# Patient Record
Sex: MALE | URBAN - METROPOLITAN AREA
[De-identification: names, ages, dates, MRNs, and addresses within clinical notes are randomized per-mention and may not be internally consistent; named-entity substitution may affect disease eponyms.]

---

## 2017-11-28 ENCOUNTER — INPATIENT (INPATIENT)
Facility: HOSPITAL | Age: 67
LOS: 1 days | Discharge: HOME | DRG: 247 | End: 2017-11-30
Attending: INTERNAL MEDICINE | Admitting: INTERNAL MEDICINE
Payer: COMMERCIAL

## 2017-11-28 VITALS
RESPIRATION RATE: 20 BRPM | HEART RATE: 73 BPM | WEIGHT: 250 LBS | HEIGHT: 72 IN | TEMPERATURE: 99 F | SYSTOLIC BLOOD PRESSURE: 144 MMHG | OXYGEN SATURATION: 98 % | DIASTOLIC BLOOD PRESSURE: 88 MMHG

## 2017-11-28 DIAGNOSIS — R93.1 ABNORMAL FINDINGS ON DIAGNOSTIC IMAGING OF HEART AND CORONARY CIRCULATION: ICD-10-CM

## 2017-11-28 DIAGNOSIS — K40.20 BILATERAL INGUINAL HERNIA, WITHOUT OBSTRUCTION OR GANGRENE, NOT SPECIFIED AS RECURRENT: Chronic | ICD-10-CM

## 2017-11-28 DIAGNOSIS — Z90.49 ACQUIRED ABSENCE OF OTHER SPECIFIED PARTS OF DIGESTIVE TRACT: Chronic | ICD-10-CM

## 2017-11-28 DIAGNOSIS — Z98.890 OTHER SPECIFIED POSTPROCEDURAL STATES: Chronic | ICD-10-CM

## 2017-11-28 LAB
ALBUMIN SERPL ELPH-MCNC: 4.6 G/DL — SIGNIFICANT CHANGE UP (ref 3.3–5)
ALP SERPL-CCNC: 63 U/L — SIGNIFICANT CHANGE UP (ref 40–120)
ALT FLD-CCNC: 56 U/L RC — HIGH (ref 10–45)
ANION GAP SERPL CALC-SCNC: 12 MMOL/L — SIGNIFICANT CHANGE UP (ref 5–17)
AST SERPL-CCNC: 33 U/L — SIGNIFICANT CHANGE UP (ref 10–40)
BILIRUB SERPL-MCNC: 0.5 MG/DL — SIGNIFICANT CHANGE UP (ref 0.2–1.2)
BUN SERPL-MCNC: 13 MG/DL — SIGNIFICANT CHANGE UP (ref 7–23)
CALCIUM SERPL-MCNC: 9.2 MG/DL — SIGNIFICANT CHANGE UP (ref 8.4–10.5)
CHLORIDE SERPL-SCNC: 103 MMOL/L — SIGNIFICANT CHANGE UP (ref 96–108)
CO2 SERPL-SCNC: 27 MMOL/L — SIGNIFICANT CHANGE UP (ref 22–31)
CREAT SERPL-MCNC: 1.05 MG/DL — SIGNIFICANT CHANGE UP (ref 0.5–1.3)
GLUCOSE SERPL-MCNC: 124 MG/DL — HIGH (ref 70–99)
HCT VFR BLD CALC: 44.5 % — SIGNIFICANT CHANGE UP (ref 39–50)
HGB BLD-MCNC: 15.3 G/DL — SIGNIFICANT CHANGE UP (ref 13–17)
MCHC RBC-ENTMCNC: 31.8 PG — SIGNIFICANT CHANGE UP (ref 27–34)
MCHC RBC-ENTMCNC: 34.3 GM/DL — SIGNIFICANT CHANGE UP (ref 32–36)
MCV RBC AUTO: 92.6 FL — SIGNIFICANT CHANGE UP (ref 80–100)
PLATELET # BLD AUTO: 246 K/UL — SIGNIFICANT CHANGE UP (ref 150–400)
POTASSIUM SERPL-MCNC: 4 MMOL/L — SIGNIFICANT CHANGE UP (ref 3.5–5.3)
POTASSIUM SERPL-SCNC: 4 MMOL/L — SIGNIFICANT CHANGE UP (ref 3.5–5.3)
PROT SERPL-MCNC: 8.1 G/DL — SIGNIFICANT CHANGE UP (ref 6–8.3)
RBC # BLD: 4.8 M/UL — SIGNIFICANT CHANGE UP (ref 4.2–5.8)
RBC # FLD: 12.9 % — SIGNIFICANT CHANGE UP (ref 10.3–14.5)
SODIUM SERPL-SCNC: 142 MMOL/L — SIGNIFICANT CHANGE UP (ref 135–145)
WBC # BLD: 8.5 K/UL — SIGNIFICANT CHANGE UP (ref 3.8–10.5)
WBC # FLD AUTO: 8.5 K/UL — SIGNIFICANT CHANGE UP (ref 3.8–10.5)

## 2017-11-28 PROCEDURE — 93454 CORONARY ARTERY ANGIO S&I: CPT | Mod: 26,59

## 2017-11-28 PROCEDURE — 92928 PRQ TCAT PLMT NTRAC ST 1 LES: CPT | Mod: LD

## 2017-11-28 PROCEDURE — 99152 MOD SED SAME PHYS/QHP 5/>YRS: CPT

## 2017-11-28 PROCEDURE — 93010 ELECTROCARDIOGRAM REPORT: CPT | Mod: 76

## 2017-11-28 RX ORDER — ALPRAZOLAM 0.25 MG
1 TABLET ORAL DAILY
Qty: 0 | Refills: 0 | Status: DISCONTINUED | OUTPATIENT
Start: 2017-11-28 | End: 2017-11-30

## 2017-11-28 RX ORDER — ATORVASTATIN CALCIUM 80 MG/1
1 TABLET, FILM COATED ORAL
Qty: 0 | Refills: 0 | COMMUNITY

## 2017-11-28 RX ORDER — SODIUM CHLORIDE 9 MG/ML
3 INJECTION INTRAMUSCULAR; INTRAVENOUS; SUBCUTANEOUS EVERY 8 HOURS
Qty: 0 | Refills: 0 | Status: DISCONTINUED | OUTPATIENT
Start: 2017-11-28 | End: 2017-11-30

## 2017-11-28 RX ORDER — ASPIRIN/CALCIUM CARB/MAGNESIUM 324 MG
81 TABLET ORAL DAILY
Qty: 0 | Refills: 0 | Status: DISCONTINUED | OUTPATIENT
Start: 2017-11-28 | End: 2017-11-30

## 2017-11-28 RX ORDER — INFLUENZA VIRUS VACCINE 15; 15; 15; 15 UG/.5ML; UG/.5ML; UG/.5ML; UG/.5ML
0.5 SUSPENSION INTRAMUSCULAR ONCE
Qty: 0 | Refills: 0 | Status: COMPLETED | OUTPATIENT
Start: 2017-11-28 | End: 2017-11-30

## 2017-11-28 RX ORDER — ALPRAZOLAM 0.25 MG
1 TABLET ORAL
Qty: 0 | Refills: 0 | COMMUNITY

## 2017-11-28 RX ORDER — ATORVASTATIN CALCIUM 80 MG/1
40 TABLET, FILM COATED ORAL AT BEDTIME
Qty: 0 | Refills: 0 | Status: DISCONTINUED | OUTPATIENT
Start: 2017-11-28 | End: 2017-11-30

## 2017-11-28 RX ORDER — CLOPIDOGREL BISULFATE 75 MG/1
75 TABLET, FILM COATED ORAL DAILY
Qty: 0 | Refills: 0 | Status: DISCONTINUED | OUTPATIENT
Start: 2017-11-28 | End: 2017-11-30

## 2017-11-28 RX ADMIN — ATORVASTATIN CALCIUM 40 MILLIGRAM(S): 80 TABLET, FILM COATED ORAL at 22:16

## 2017-11-28 RX ADMIN — SODIUM CHLORIDE 3 MILLILITER(S): 9 INJECTION INTRAMUSCULAR; INTRAVENOUS; SUBCUTANEOUS at 22:16

## 2017-11-28 RX ADMIN — Medication 1 MILLIGRAM(S): at 22:15

## 2017-11-28 RX ADMIN — SODIUM CHLORIDE 3 MILLILITER(S): 9 INJECTION INTRAMUSCULAR; INTRAVENOUS; SUBCUTANEOUS at 13:06

## 2017-11-28 NOTE — H&P CARDIOLOGY - HISTORY OF PRESENT ILLNESS
68 y/o  m tarik with PMHx of Anxiety, HLD,no cardiac disease, family h/o of heart disease ( mom 90 ICD), current smoker, asymptomatic now for routine stress testing reveals ischemia in anterior apical - septal wall -LAD area there is mild septal hypokinesis, seen & evaluated by Dr Dawkins & now recommends for cardiac cath.    toprol 25 & ranexa 500 mg x 1 dose as antianginals

## 2017-11-28 NOTE — H&P CARDIOLOGY - PSH
Bilateral inguinal hernia (BIH)  repair wiyh mesh  H/O right knee surgery    History of cholecystectomy

## 2017-11-28 NOTE — PATIENT PROFILE ADULT. - ABILITY TO HEAR (WITH HEARING AID OR HEARING APPLIANCE IF NORMALLY USED):
hearing aids at home/Mildly to Moderately Impaired: difficulty hearing in some environments or speaker may need to increase volume or speak distinctly

## 2017-11-29 ENCOUNTER — TRANSCRIPTION ENCOUNTER (OUTPATIENT)
Age: 67
End: 2017-11-29

## 2017-11-29 LAB
ANION GAP SERPL CALC-SCNC: 14 MMOL/L — SIGNIFICANT CHANGE UP (ref 5–17)
BUN SERPL-MCNC: 14 MG/DL — SIGNIFICANT CHANGE UP (ref 7–23)
CALCIUM SERPL-MCNC: 9.2 MG/DL — SIGNIFICANT CHANGE UP (ref 8.4–10.5)
CHLORIDE SERPL-SCNC: 103 MMOL/L — SIGNIFICANT CHANGE UP (ref 96–108)
CO2 SERPL-SCNC: 23 MMOL/L — SIGNIFICANT CHANGE UP (ref 22–31)
CREAT SERPL-MCNC: 0.91 MG/DL — SIGNIFICANT CHANGE UP (ref 0.5–1.3)
GLUCOSE SERPL-MCNC: 92 MG/DL — SIGNIFICANT CHANGE UP (ref 70–99)
HCT VFR BLD CALC: 42.1 % — SIGNIFICANT CHANGE UP (ref 39–50)
HGB BLD-MCNC: 14.6 G/DL — SIGNIFICANT CHANGE UP (ref 13–17)
MCHC RBC-ENTMCNC: 32.1 PG — SIGNIFICANT CHANGE UP (ref 27–34)
MCHC RBC-ENTMCNC: 34.7 GM/DL — SIGNIFICANT CHANGE UP (ref 32–36)
MCV RBC AUTO: 92.6 FL — SIGNIFICANT CHANGE UP (ref 80–100)
PLATELET # BLD AUTO: 241 K/UL — SIGNIFICANT CHANGE UP (ref 150–400)
POTASSIUM SERPL-MCNC: 4.1 MMOL/L — SIGNIFICANT CHANGE UP (ref 3.5–5.3)
POTASSIUM SERPL-SCNC: 4.1 MMOL/L — SIGNIFICANT CHANGE UP (ref 3.5–5.3)
RBC # BLD: 4.55 M/UL — SIGNIFICANT CHANGE UP (ref 4.2–5.8)
RBC # FLD: 13 % — SIGNIFICANT CHANGE UP (ref 10.3–14.5)
SODIUM SERPL-SCNC: 140 MMOL/L — SIGNIFICANT CHANGE UP (ref 135–145)
WBC # BLD: 10.4 K/UL — SIGNIFICANT CHANGE UP (ref 3.8–10.5)
WBC # FLD AUTO: 10.4 K/UL — SIGNIFICANT CHANGE UP (ref 3.8–10.5)

## 2017-11-29 PROCEDURE — 93010 ELECTROCARDIOGRAM REPORT: CPT

## 2017-11-29 PROCEDURE — 92933 PRQ TRLML C ATHRC ST ANGIOP1: CPT | Mod: RC

## 2017-11-29 PROCEDURE — 99152 MOD SED SAME PHYS/QHP 5/>YRS: CPT

## 2017-11-29 RX ORDER — SENNA PLUS 8.6 MG/1
2 TABLET ORAL ONCE
Qty: 0 | Refills: 0 | Status: COMPLETED | OUTPATIENT
Start: 2017-11-29 | End: 2017-11-29

## 2017-11-29 RX ORDER — CLOPIDOGREL BISULFATE 75 MG/1
1 TABLET, FILM COATED ORAL
Qty: 0 | Refills: 0 | COMMUNITY
Start: 2017-11-29

## 2017-11-29 RX ORDER — ASPIRIN/CALCIUM CARB/MAGNESIUM 324 MG
1 TABLET ORAL
Qty: 0 | Refills: 0 | COMMUNITY
Start: 2017-11-29

## 2017-11-29 RX ADMIN — CLOPIDOGREL BISULFATE 75 MILLIGRAM(S): 75 TABLET, FILM COATED ORAL at 06:10

## 2017-11-29 RX ADMIN — Medication 1 MILLIGRAM(S): at 21:53

## 2017-11-29 RX ADMIN — ATORVASTATIN CALCIUM 40 MILLIGRAM(S): 80 TABLET, FILM COATED ORAL at 21:53

## 2017-11-29 RX ADMIN — Medication 81 MILLIGRAM(S): at 06:10

## 2017-11-29 RX ADMIN — SENNA PLUS 2 TABLET(S): 8.6 TABLET ORAL at 08:42

## 2017-11-29 RX ADMIN — SODIUM CHLORIDE 3 MILLILITER(S): 9 INJECTION INTRAMUSCULAR; INTRAVENOUS; SUBCUTANEOUS at 06:11

## 2017-11-29 RX ADMIN — SODIUM CHLORIDE 3 MILLILITER(S): 9 INJECTION INTRAMUSCULAR; INTRAVENOUS; SUBCUTANEOUS at 21:21

## 2017-11-29 RX ADMIN — SODIUM CHLORIDE 3 MILLILITER(S): 9 INJECTION INTRAMUSCULAR; INTRAVENOUS; SUBCUTANEOUS at 13:13

## 2017-11-29 NOTE — DISCHARGE NOTE ADULT - HOSPITAL COURSE
66 y/o  m tarik with PMHx of Anxiety, HLD,no cardiac disease, family h/o of heart disease ( mom 90 ICD), current smoker, asymptomatic now for routine stress testing reveals ischemia in anterior apical - septal wall -LAD area there is mild septal hypokinesis, seen & evaluated by Dr Dawkins & now recommends for cardiac cath. Pt is now s/p cardiac cath SHE x 1 pLAD (11/28) via right radial  and SHE x 1 RCA (11/29) via right radial. Pt tolerated the procedure well. Cardiac cath site benign. Post-procedure discharge instructions discussed and questions addressed.

## 2017-11-29 NOTE — DISCHARGE NOTE ADULT - ADDITIONAL INSTRUCTIONS
Follow-up with you cardiologist in 1-2 weeks. DO NOT STOP TAKING ASPRIN AND PLAVIX BEFORE SPEAKING WITH YOUR CARDIOLOGIST

## 2017-11-29 NOTE — DISCHARGE NOTE ADULT - MEDICATION SUMMARY - MEDICATIONS TO TAKE
I will START or STAY ON the medications listed below when I get home from the hospital:    aspirin 81 mg oral delayed release tablet  -- 1 tab(s) by mouth once a day  -- Indication: For To keep stent patent     atorvastatin 40 mg oral tablet  -- 1 tab(s) by mouth once a day  -- Indication: For Hyperlipidemia     clopidogrel 75 mg oral tablet  -- 1 tab(s) by mouth once a day  -- Indication: For To keep stent patent     ALPRAZolam 1 mg oral tablet  -- 1 tab(s) by mouth once a day  -- Indication: For Anxiety

## 2017-11-29 NOTE — DISCHARGE NOTE ADULT - CARE PLAN
Principal Discharge DX:	CAD (coronary artery disease)  Goal:	Pt remains chest pain free and understands post cath discharge instructions  Instructions for follow-up, activity and diet:	No heavy lifting or pushing/pulling with procedure arm for 2 weeks. No driving for 2 days. You may shower 24 hours following the procedure but avoid baths/swimming for 1 week. Check your wrist site for bleeding and/or swelling daily following procedure and call your doctor immediately if it occurs or if you experience increased pain at the site. Follow up with your cardiologist in 1-2 weeks. You may call [facility] if you have any questions/concerns regarding your procedure [phone].  Secondary Diagnosis:	HLD (hyperlipidemia)  Goal:	Your LDL cholesterol will be less than 70mg/dL  Instructions for follow-up, activity and diet:	Continue with your cholesterol medications. Eat a heart healthy diet that is low in saturated fats and salt, and includes whole grains, fruits, vegetables and lean protein; exercise regularly (consult with your physician or cardiologist first); maintain a heart healthy weight. Continue to follow with your primary physician or cardiologist for treatment goals, continue medication, have liver function testing every 3 months as anti lipid medications can cause liver irritation. If you smoke - quit (A resource to help you stop smoking is the [facility] – phone number 364-875-3456.).

## 2017-11-29 NOTE — DISCHARGE NOTE ADULT - PLAN OF CARE
Pt remains chest pain free and understands post cath discharge instructions No heavy lifting or pushing/pulling with procedure arm for 2 weeks. No driving for 2 days. You may shower 24 hours following the procedure but avoid baths/swimming for 1 week. Check your wrist site for bleeding and/or swelling daily following procedure and call your doctor immediately if it occurs or if you experience increased pain at the site. Follow up with your cardiologist in 1-2 weeks. You may call [facility] if you have any questions/concerns regarding your procedure [phone]. Your LDL cholesterol will be less than 70mg/dL Continue with your cholesterol medications. Eat a heart healthy diet that is low in saturated fats and salt, and includes whole grains, fruits, vegetables and lean protein; exercise regularly (consult with your physician or cardiologist first); maintain a heart healthy weight. Continue to follow with your primary physician or cardiologist for treatment goals, continue medication, have liver function testing every 3 months as anti lipid medications can cause liver irritation. If you smoke - quit (A resource to help you stop smoking is the [facility] – phone number [phone].).

## 2017-11-30 VITALS
OXYGEN SATURATION: 93 % | HEART RATE: 79 BPM | RESPIRATION RATE: 17 BRPM | DIASTOLIC BLOOD PRESSURE: 60 MMHG | SYSTOLIC BLOOD PRESSURE: 104 MMHG

## 2017-11-30 LAB
ANION GAP SERPL CALC-SCNC: 12 MMOL/L — SIGNIFICANT CHANGE UP (ref 5–17)
BUN SERPL-MCNC: 18 MG/DL — SIGNIFICANT CHANGE UP (ref 7–23)
CALCIUM SERPL-MCNC: 8.9 MG/DL — SIGNIFICANT CHANGE UP (ref 8.4–10.5)
CHLORIDE SERPL-SCNC: 102 MMOL/L — SIGNIFICANT CHANGE UP (ref 96–108)
CO2 SERPL-SCNC: 25 MMOL/L — SIGNIFICANT CHANGE UP (ref 22–31)
CREAT SERPL-MCNC: 0.94 MG/DL — SIGNIFICANT CHANGE UP (ref 0.5–1.3)
GLUCOSE SERPL-MCNC: 92 MG/DL — SIGNIFICANT CHANGE UP (ref 70–99)
HCT VFR BLD CALC: 40.2 % — SIGNIFICANT CHANGE UP (ref 39–50)
HGB BLD-MCNC: 13.6 G/DL — SIGNIFICANT CHANGE UP (ref 13–17)
MCHC RBC-ENTMCNC: 31.5 PG — SIGNIFICANT CHANGE UP (ref 27–34)
MCHC RBC-ENTMCNC: 33.9 GM/DL — SIGNIFICANT CHANGE UP (ref 32–36)
MCV RBC AUTO: 93 FL — SIGNIFICANT CHANGE UP (ref 80–100)
PLATELET # BLD AUTO: 220 K/UL — SIGNIFICANT CHANGE UP (ref 150–400)
POTASSIUM SERPL-MCNC: 3.9 MMOL/L — SIGNIFICANT CHANGE UP (ref 3.5–5.3)
POTASSIUM SERPL-SCNC: 3.9 MMOL/L — SIGNIFICANT CHANGE UP (ref 3.5–5.3)
RBC # BLD: 4.32 M/UL — SIGNIFICANT CHANGE UP (ref 4.2–5.8)
RBC # FLD: 12.9 % — SIGNIFICANT CHANGE UP (ref 10.3–14.5)
SODIUM SERPL-SCNC: 139 MMOL/L — SIGNIFICANT CHANGE UP (ref 135–145)
WBC # BLD: 9.8 K/UL — SIGNIFICANT CHANGE UP (ref 3.8–10.5)
WBC # FLD AUTO: 9.8 K/UL — SIGNIFICANT CHANGE UP (ref 3.8–10.5)

## 2017-11-30 PROCEDURE — 93010 ELECTROCARDIOGRAM REPORT: CPT

## 2017-11-30 PROCEDURE — 93010 ELECTROCARDIOGRAM REPORT: CPT | Mod: 77,59

## 2017-11-30 RX ORDER — CLOPIDOGREL BISULFATE 75 MG/1
1 TABLET, FILM COATED ORAL
Qty: 30 | Refills: 11 | OUTPATIENT
Start: 2017-11-30 | End: 2018-11-24

## 2017-11-30 RX ORDER — ASPIRIN/CALCIUM CARB/MAGNESIUM 324 MG
1 TABLET ORAL
Qty: 30 | Refills: 11 | OUTPATIENT
Start: 2017-11-30 | End: 2018-11-24

## 2017-11-30 RX ADMIN — SODIUM CHLORIDE 3 MILLILITER(S): 9 INJECTION INTRAMUSCULAR; INTRAVENOUS; SUBCUTANEOUS at 05:22

## 2017-11-30 RX ADMIN — Medication 81 MILLIGRAM(S): at 05:22

## 2017-11-30 RX ADMIN — CLOPIDOGREL BISULFATE 75 MILLIGRAM(S): 75 TABLET, FILM COATED ORAL at 05:22

## 2017-11-30 RX ADMIN — INFLUENZA VIRUS VACCINE 0.5 MILLILITER(S): 15; 15; 15; 15 SUSPENSION INTRAMUSCULAR at 05:25

## 2017-11-30 NOTE — PROGRESS NOTE ADULT - SUBJECTIVE AND OBJECTIVE BOX
67y old  Male who presents with a chief complaint of chest pain (29 Nov 2017 23:25) now s/p cardiac cath SHE x 1 pLAD (11/28) and SHE  x 1 RCA (11/29) via right radial.            Allergies    No Known Allergies    Intolerances        Medications:  ALPRAZolam 1 milliGRAM(s) Oral daily  aspirin enteric coated 81 milliGRAM(s) Oral daily  atorvastatin 40 milliGRAM(s) Oral at bedtime  clopidogrel Tablet 75 milliGRAM(s) Oral daily  influenza   Vaccine 0.5 milliLiter(s) IntraMuscular once  sodium chloride 0.9% lock flush 3 milliLiter(s) IV Push every 8 hours      Vitals:  T(C): 36.7 (11-29-17 @ 20:50), Max: 36.8 (11-29-17 @ 14:02)  HR: 68 (11-29-17 @ 22:20) (59 - 77)  BP: 112/75 (11-29-17 @ 22:20) (112/75 - 140/91)  BP(mean): --  RR: 17 (11-29-17 @ 22:20) (17 - 19)  SpO2: 92% (11-29-17 @ 21:20) (92% - 98%)  Wt(kg): --  Daily     Daily   I&O's Summary    28 Nov 2017 07:01  -  29 Nov 2017 07:00  --------------------------------------------------------  IN: 650 mL / OUT: 0 mL / NET: 650 mL    29 Nov 2017 07:01  -  30 Nov 2017 05:05  --------------------------------------------------------  IN: 240 mL / OUT: 0 mL / NET: 240 mL          Physical Exam:  Appearance: Normal  Cardiovascular: S1S2, RRR, No M/R/G, no JVD, No Lower extremity edema  Procedural Access Site: Right radial. No hematoma, Non-tender to palpation, 2+ pulse, No bruit, No Ecchymosis  Respiratory: Clear to auscultation bilaterally  Gastrointestinal: Soft, Non tender, Normal Bowel Sounds  Musculoskeletal: No clubbing, No joint deformity   Neurologic: Non-focal  Psychiatry: AAOx3, Mood & affect appropriate  Skin: No rashes, No ecchymoses, No cyanosis    11-30    139  |  102  |  18  ----------------------------<  92  3.9   |  25  |  0.94    Ca    8.9      30 Nov 2017 00:12    TPro  8.1  /  Alb  4.6  /  TBili  0.5  /  DBili  x   /  AST  33  /  ALT  56<H>  /  AlkPhos  63  11-28          ECG: NSR 68bpm       Interpretation of Telemetry: SR 60' s     ASSESSMENT/PLAN  67y old  Male who presents with a chief complaint of chest pain (29 Nov 2017 23:25) now s/p cardiac cath SHE x 1 pLAD (11/28) and SHE  x 1 RCA (11/29) via radial. Pt tolerated the procedure well. Cardiac cath site benign. Overnight uneventful. Post-procedure discharge instructions discussed and questions well. 67y old  Male who presents with a chief complaint of chest pain (29 Nov 2017 23:25) now s/p cardiac cath SHE x 1 pLAD (11/28) and SHE  x 1 RCA (11/29) via right radial.            Allergies    No Known Allergies    Intolerances        Medications:  ALPRAZolam 1 milliGRAM(s) Oral daily  aspirin enteric coated 81 milliGRAM(s) Oral daily  atorvastatin 40 milliGRAM(s) Oral at bedtime  clopidogrel Tablet 75 milliGRAM(s) Oral daily  influenza   Vaccine 0.5 milliLiter(s) IntraMuscular once  sodium chloride 0.9% lock flush 3 milliLiter(s) IV Push every 8 hours      Vitals:  T(C): 36.7 (11-29-17 @ 20:50), Max: 36.8 (11-29-17 @ 14:02)  HR: 68 (11-29-17 @ 22:20) (59 - 77)  BP: 112/75 (11-29-17 @ 22:20) (112/75 - 140/91)  BP(mean): --  RR: 17 (11-29-17 @ 22:20) (17 - 19)  SpO2: 92% (11-29-17 @ 21:20) (92% - 98%)  Wt(kg): --  Daily     Daily   I&O's Summary    28 Nov 2017 07:01  -  29 Nov 2017 07:00  --------------------------------------------------------  IN: 650 mL / OUT: 0 mL / NET: 650 mL    29 Nov 2017 07:01  -  30 Nov 2017 05:05  --------------------------------------------------------  IN: 240 mL / OUT: 0 mL / NET: 240 mL          Physical Exam:  Appearance: Normal  Cardiovascular: S1S2, RRR, No M/R/G, no JVD, No Lower extremity edema  Procedural Access Site: Right radial. No hematoma, Non-tender to palpation, 2+ pulse, No bruit, No Ecchymosis  Respiratory: Clear to auscultation bilaterally  Gastrointestinal: Soft, Non tender, Normal Bowel Sounds  Musculoskeletal: No clubbing, No joint deformity   Neurologic: Non-focal  Psychiatry: AAOx3, Mood & affect appropriate  Skin: No rashes, No ecchymoses, No cyanosis    11-30    139  |  102  |  18  ----------------------------<  92  3.9   |  25  |  0.94    Ca    8.9      30 Nov 2017 00:12    TPro  8.1  /  Alb  4.6  /  TBili  0.5  /  DBili  x   /  AST  33  /  ALT  56<H>  /  AlkPhos  63  11-28          ECG: NSR 68bpm       Interpretation of Telemetry: SR 60' s     ASSESSMENT/PLAN  67y old  Male who presents with a chief complaint of chest pain (29 Nov 2017 23:25) now s/p cardiac cath SHE x 1 pLAD (11/28) and SHE  x 1 RCA (11/29) via radial. Pt tolerated the procedure well. Cardiac cath site benign. Overnight uneventful. Post-procedure discharge instructions discussed and questions addressed.

## 2017-12-19 PROCEDURE — 99153 MOD SED SAME PHYS/QHP EA: CPT

## 2017-12-19 PROCEDURE — 93005 ELECTROCARDIOGRAM TRACING: CPT

## 2017-12-19 PROCEDURE — C1724: CPT

## 2017-12-19 PROCEDURE — C1887: CPT

## 2017-12-19 PROCEDURE — C1769: CPT

## 2017-12-19 PROCEDURE — 80053 COMPREHEN METABOLIC PANEL: CPT

## 2017-12-19 PROCEDURE — C1725: CPT

## 2017-12-19 PROCEDURE — C9602: CPT | Mod: RC

## 2017-12-19 PROCEDURE — 90686 IIV4 VACC NO PRSV 0.5 ML IM: CPT

## 2017-12-19 PROCEDURE — C9600: CPT | Mod: LD

## 2017-12-19 PROCEDURE — 80048 BASIC METABOLIC PNL TOTAL CA: CPT

## 2017-12-19 PROCEDURE — 93454 CORONARY ARTERY ANGIO S&I: CPT | Mod: 59

## 2017-12-19 PROCEDURE — C1874: CPT

## 2017-12-19 PROCEDURE — 99152 MOD SED SAME PHYS/QHP 5/>YRS: CPT

## 2017-12-19 PROCEDURE — C1894: CPT

## 2017-12-19 PROCEDURE — 85027 COMPLETE CBC AUTOMATED: CPT

## 2023-11-21 NOTE — DISCHARGE NOTE ADULT - PATIENT PORTAL LINK FT
Patient refused po trials from ST during session despite ST's encouragement.    “You can access the [facility] Patient Portal, offered by [facility], by registering with the following website: [email or web address]”